# Patient Record
Sex: FEMALE | Race: BLACK OR AFRICAN AMERICAN | URBAN - METROPOLITAN AREA
[De-identification: names, ages, dates, MRNs, and addresses within clinical notes are randomized per-mention and may not be internally consistent; named-entity substitution may affect disease eponyms.]

---

## 2022-05-11 ENCOUNTER — OFFICE VISIT (OUTPATIENT)
Dept: URGENT CARE | Facility: CLINIC | Age: 62
End: 2022-05-11
Payer: COMMERCIAL

## 2022-05-11 VITALS
WEIGHT: 147 LBS | SYSTOLIC BLOOD PRESSURE: 135 MMHG | HEIGHT: 59 IN | HEART RATE: 101 BPM | TEMPERATURE: 99 F | DIASTOLIC BLOOD PRESSURE: 81 MMHG | RESPIRATION RATE: 18 BRPM | OXYGEN SATURATION: 96 % | BODY MASS INDEX: 29.64 KG/M2

## 2022-05-11 DIAGNOSIS — M62.838 MUSCLE SPASMS OF NECK: Primary | ICD-10-CM

## 2022-05-11 PROCEDURE — 3079F DIAST BP 80-89 MM HG: CPT | Mod: CPTII,S$GLB,, | Performed by: FAMILY MEDICINE

## 2022-05-11 PROCEDURE — 1160F RVW MEDS BY RX/DR IN RCRD: CPT | Mod: CPTII,S$GLB,, | Performed by: FAMILY MEDICINE

## 2022-05-11 PROCEDURE — 96372 THER/PROPH/DIAG INJ SC/IM: CPT | Mod: S$GLB,,, | Performed by: FAMILY MEDICINE

## 2022-05-11 PROCEDURE — 1160F PR REVIEW ALL MEDS BY PRESCRIBER/CLIN PHARMACIST DOCUMENTED: ICD-10-PCS | Mod: CPTII,S$GLB,, | Performed by: FAMILY MEDICINE

## 2022-05-11 PROCEDURE — 3075F SYST BP GE 130 - 139MM HG: CPT | Mod: CPTII,S$GLB,, | Performed by: FAMILY MEDICINE

## 2022-05-11 PROCEDURE — 1159F PR MEDICATION LIST DOCUMENTED IN MEDICAL RECORD: ICD-10-PCS | Mod: CPTII,S$GLB,, | Performed by: FAMILY MEDICINE

## 2022-05-11 PROCEDURE — 3075F PR MOST RECENT SYSTOLIC BLOOD PRESS GE 130-139MM HG: ICD-10-PCS | Mod: CPTII,S$GLB,, | Performed by: FAMILY MEDICINE

## 2022-05-11 PROCEDURE — 1159F MED LIST DOCD IN RCRD: CPT | Mod: CPTII,S$GLB,, | Performed by: FAMILY MEDICINE

## 2022-05-11 PROCEDURE — 96372 PR INJECTION,THERAP/PROPH/DIAG2ST, IM OR SUBCUT: ICD-10-PCS | Mod: S$GLB,,, | Performed by: FAMILY MEDICINE

## 2022-05-11 PROCEDURE — 99203 PR OFFICE/OUTPT VISIT, NEW, LEVL III, 30-44 MIN: ICD-10-PCS | Mod: 25,S$GLB,, | Performed by: FAMILY MEDICINE

## 2022-05-11 PROCEDURE — 3079F PR MOST RECENT DIASTOLIC BLOOD PRESSURE 80-89 MM HG: ICD-10-PCS | Mod: CPTII,S$GLB,, | Performed by: FAMILY MEDICINE

## 2022-05-11 PROCEDURE — 3008F PR BODY MASS INDEX (BMI) DOCUMENTED: ICD-10-PCS | Mod: CPTII,S$GLB,, | Performed by: FAMILY MEDICINE

## 2022-05-11 PROCEDURE — 3008F BODY MASS INDEX DOCD: CPT | Mod: CPTII,S$GLB,, | Performed by: FAMILY MEDICINE

## 2022-05-11 PROCEDURE — 99203 OFFICE O/P NEW LOW 30 MIN: CPT | Mod: 25,S$GLB,, | Performed by: FAMILY MEDICINE

## 2022-05-11 RX ORDER — IBUPROFEN 800 MG/1
800 TABLET ORAL 3 TIMES DAILY
Qty: 30 TABLET | Refills: 0 | Status: SHIPPED | OUTPATIENT
Start: 2022-05-11 | End: 2022-05-21

## 2022-05-11 RX ORDER — CYCLOBENZAPRINE HCL 10 MG
10 TABLET ORAL 3 TIMES DAILY PRN
Qty: 30 TABLET | Refills: 0 | Status: SHIPPED | OUTPATIENT
Start: 2022-05-11 | End: 2022-05-21

## 2022-05-11 RX ORDER — KETOROLAC TROMETHAMINE 30 MG/ML
30 INJECTION, SOLUTION INTRAMUSCULAR; INTRAVENOUS
Status: COMPLETED | OUTPATIENT
Start: 2022-05-11 | End: 2022-05-11

## 2022-05-11 RX ADMIN — KETOROLAC TROMETHAMINE 30 MG: 30 INJECTION, SOLUTION INTRAMUSCULAR; INTRAVENOUS at 12:05

## 2022-05-11 NOTE — PROGRESS NOTES
"Subjective:       Patient ID: Cris Rehman is a 62 y.o. female.    Vitals:  height is 4' 11" (1.499 m) and weight is 66.7 kg (147 lb). Her temperature is 98.6 °F (37 °C). Her blood pressure is 135/81 and her pulse is 101. Her respiration is 18 and oxygen saturation is 96%.     Chief Complaint: Neck Pain    Pt is coming in today with neck pain on her right side. Pt says it started Saturday, and does not know what she could have did for it to be in so much pain. Pt says this is the first time this has happened. Pt says the pain is worse at night. Pt took tylenl and ibuprofen but with no relief.   Provider note begins below:  Pt reports she is in town from [facility], drove here from [facility]. She started with neck pain Friday, denies injury or trauma, worse the past 2 days, she has hx of htn, and dm, compliant with meds, has pcp in [facility]. Denies similar pain in the past. She feels like it is spasms and "balling in pain" at times worse with movements.     Neck Pain   This is a new problem. The current episode started in the past 7 days. The problem occurs constantly. The problem has been gradually worsening. The pain is associated with an unknown factor. The pain is present in the right side. The quality of the pain is described as shooting (sore ). The pain is at a severity of 10/10. The pain is severe. The symptoms are aggravated by twisting. The pain is same all the time. Stiffness is present in the morning. Pertinent negatives include no fever, headaches or numbness. She has tried acetaminophen (ibuprofen, icy hot) for the symptoms. The treatment provided no relief.       Constitution: Negative for activity change, appetite change, chills, sweating, fatigue, fever, unexpected weight change and generalized weakness.   Neck: Positive for neck pain.   Musculoskeletal: Positive for pain. Negative for trauma, joint pain, joint swelling and abnormal ROM of joint.        Neck spasms   Skin: Negative for rash.   Neurological: " Negative for loss of balance, headaches, history of migraines, altered mental status, loss of consciousness, numbness and tingling.   Psychiatric/Behavioral: Negative for altered mental status.       Objective:      Physical Exam   Constitutional: She is oriented to person, place, and time. She appears well-developed.  Non-toxic appearance. She does not appear ill. No distress.   HENT:   Head: Normocephalic and atraumatic.   Ears:   Right Ear: External ear normal.   Left Ear: External ear normal.   Nose: Nose normal.   Mouth/Throat: Oropharynx is clear and moist.   Eyes: Conjunctivae, EOM and lids are normal. Pupils are equal, round, and reactive to light.   Neck: Trachea normal and phonation normal. Neck supple. No crepitus. There are no signs of injury. No torticollis present. No edema present. No erythema present. No neck rigidity present. decreased range of motion present. pain with movement present. No spinous process tenderness present. muscular tenderness present.   Musculoskeletal:      Cervical back: She exhibits spasm. She exhibits no tenderness, no bony tenderness, no swelling, no deformity and no laceration.        Back:    Neurological: She is alert and oriented to person, place, and time.   Skin: Skin is warm, dry, intact and not diaphoretic.   Psychiatric: Her speech is normal and behavior is normal. Judgment and thought content normal.   Nursing note and vitals reviewed.        Assessment:       1. Muscle spasms of neck          Plan:       Advised to continue he therapy, lidocaine patches and icy Hot, Biofreeze.  Toradol injection today, Flexeril, ibuprofen.  She will return home Sunday and will follow up with PCP next week.     The patient was advised that NSAID-type medications have two very important potential side effects: gastrointestinal irritation including hemorrhage and renal injuries. Pt was asked to take the medication with food and to stop if he/she experiences any GI upset. I asked pt  to call for vomiting, abdominal pain or black/bloody stools. The patient expresses understanding of these issues and questions were answered.  Pt educated on common NSAIDS, and instructed not to mix them, and to follow instructions on the label for the formulations available over the counter. Pt informed that common NSAIDs include: regular aspirin, Aleve, Advil, Excedrin, ibuprofen, Motrin, Goody's or, BC powders, meloxicam, naproxen, diclofenac, Celebrex. Patient was counseled that the chronic use of anti-inflammatory medication can increase the risk of GI bleed and cardiovascular events as well as cause kidney damage and increase blood pressure.    Discussed results/diagnosis/plan with patient in clinic. Strict precautions given to patient to monitor for worsening signs and symptoms. Advised to follow up with PCP or specialist.    Explained side effects of medications prescribed with patient and informed him/her to discontinue use if he/she has any side effects and to inform UC or PCP if this occurs. All questions answered. Strict ED verses clinic return precautions stressed and given in depth. Advised if symptoms worsens of fail to improve he/she should go to the Emergency Room. Discharge and follow-up instructions given verbally/printed with the patient who expressed understanding and willingness to comply with my recommendations. Patient voiced understanding and in agreement with current treatment plan. Patient exits the exam room in no acute distress. Conversant and engaged during discharge discussion, verbalized understanding.      Muscle spasms of neck  -     ketorolac injection 30 mg  -     cyclobenzaprine (FLEXERIL) 10 MG tablet; Take 1 tablet (10 mg total) by mouth 3 (three) times daily as needed for Muscle spasms.  Dispense: 30 tablet; Refill: 0  -     ibuprofen (ADVIL,MOTRIN) 800 MG tablet; Take 1 tablet (800 mg total) by mouth 3 (three) times daily. Take with food. for 10 days  Dispense: 30 tablet;  Refill: 0                 Patient Instructions   General Discharge Instructions   PLEASE READ YOUR DISCHARGE INSTRUCTIONS ENTIRELY AS IT CONTAINS IMPORTANT INFORMATION.  If you were prescribed a narcotic or controlled medication, do not drive or operate heavy equipment or machinery while taking these medications.  If you were prescribed antibiotics, please take them to completion.  You must understand that you've received an Urgent Care treatment only and that you may be released before all your medical problems are known or treated. You, the patient, will arrange for follow up care as instructed.    OVER THE COUNTER RECOMMENDATIONS/SUGGESTIONS.    Make sure to stay well hydrated.    Use Nasal Saline to mechanically move any post nasal drip from your eustachian tube or from the back of your throat.    Use warm salt water gargles to ease your throat pain. Warm salt water gargles as needed for sore throat- 1/2 tsp salt to 1 cup warm water, gargle as desired.    Use an antihistamine such as Claritin, Zyrtec or Allegra to dry you out.    Use pseudoephedrine (behind the counter) to decongest. Pseudoephedrine 30 mg up to 240 mg /day. It can raise your blood pressure and give you palpitations.    Use mucinex (guaifenesin) to break up mucous up to 2400mg/day to loosen any mucous.    The mucinex DM pill has a cough suppressant that can be sedating. It can be used at night to stop the tickle at the back of your throat.    You can use Mucinex D (it has guaifenesin and a high dose of pseudoephedrine) in the mornings to help decongest.    Use Afrin in each nare for no longer than 3 days, as it is addictive. It can also dry out your mucous membranes and cause elevated blood pressure. This is especially useful if you are flying.    Use Flonase 1-2 sprays/nostril per day. It is a local acting steroid nasal spray, if you develop a bloody nose, stop using the medication immediately.    Sometimes Nyquil at night is beneficial to help  you get some rest, however it is sedating and it does have an antihistamine, and tylenol.    Honey is a natural cough suppressant that can be used.    Tylenol up to 4,000 mg a day is safe for short periods and can be used for body aches, pain, and fever. However in high doses and prolonged use it can cause liver irritation.    Ibuprofen is a non-steroidal anti-inflammatory that can be used for body aches, pain, and fever.However it can also cause stomach irritation if over used.     Follow up with your PCP or specialty clinic as instructed in the next 2-3 days if not improved or as needed. You can call [phone] to schedule an appointment with appropriate provider.      If you condition worsens, we recommend that you receive another evaluation at the emergency room immediately or contact your primary medical clinic's after hours call service to discuss your concerns.      Please return here or go to the Emergency Department for any concerns or worsening condition.   You can also call [phone] to schedule an appointment with the appropriate provider.    Please return here or go to the Emergency Department for any concerns or worsening of condition.    Thank you for choosing [facility]!    Our goal in the Urgent Care is to always provide outstanding medical care. You may receive a survey by mail or e-mail in the next week regarding your experience today. We would greatly appreciate you completing and returning the survey. Your feedback provides us with a way to recognize our staff who provide very good care, and it helps us learn how to improve when your experience was below our aspiration of excellence.      We appreciate you trusting us with your medical care. We hope you feel better soon. We will be happy to take care of you for all of your future medical needs.    Sincerely,    RAYO Dumas-JESSICA      Back Pain Discharge Instructions    Alternate heat and ice for first 48 hours then  apply  heat. You may do gently stretching if tolerable.    Moist warm compresss to area several times daily.  May use a heating pad on LOW to provide heat over a towel which was dampended with warm water. DO NOT FALL ASLEEP WITH HEATING PAD ON.  Do not stay in one position to long.  When sleeping on your back place a pillow under knees to reduce tension on back.  If sleeping on your side, place pillow between knees to keep spine in better alinement.  Wear supportive shoes such as tennis shoes for support of the lower back.  Take any medication as directed.    If you were not prescribed an anti-inflammatory medication, and if you do not have any history of stomach/intestinal ulcers, or kidney disease, or are not taking a blood thinner such as Coumadin, Plavix, Pradaxa, Eloquis, or Xaralta for example, it is OK to take over the counter Ibuprofen or Advil or Motrin or Aleve as directed.  Do not take these medications on an empty stomach.    If you were prescribed a narcotic medication, do not drive or operate heavy equipment or machinery while taking these medications.    If you lose control of your bowel and/or bladder; lose sensation in between your legs by your genitalia and/or rectum or  lose control or sensation of any extremity, please go to the nearest Emergency Department immediately.    Flexeril Warning:   The medication you have been prescribed may cause drowsiness and impair your judgement.  Therefore, you should avoid driving, climbing, using machinery, etc., so as not to increase your risk of injury.  Do NOT drink any alcohol while on this medication(s).    Muscle Strain  Please return here or go to the Emergency Department for any concerns or worsening of condition.   If you were prescribed a narcotic medication, do not drive or operate heavy equipment or machinery while taking these medications.   Tylenol as needed for pain or discomfort  Use a pillow to brace your right side for sneezing, coughing or  laughing.  You can take the Muscle Relaxant (flexeril) on today as prescribed.   Warm compresses to the affected area.  If you lose sensation in between your legs by your genitalia and/or rectum, please go to the nearest Emergency Department immediately.   Follow up with your PCP in the next 3-5 days or sooner if no improvement.

## 2022-05-11 NOTE — PATIENT INSTRUCTIONS
General Discharge Instructions   PLEASE READ YOUR DISCHARGE INSTRUCTIONS ENTIRELY AS IT CONTAINS IMPORTANT INFORMATION.  If you were prescribed a narcotic or controlled medication, do not drive or operate heavy equipment or machinery while taking these medications.  If you were prescribed antibiotics, please take them to completion.  You must understand that you've received an Urgent Care treatment only and that you may be released before all your medical problems are known or treated. You, the patient, will arrange for follow up care as instructed.    OVER THE COUNTER RECOMMENDATIONS/SUGGESTIONS.    Make sure to stay well hydrated.    Use Nasal Saline to mechanically move any post nasal drip from your eustachian tube or from the back of your throat.    Use warm salt water gargles to ease your throat pain. Warm salt water gargles as needed for sore throat- 1/2 tsp salt to 1 cup warm water, gargle as desired.    Use an antihistamine such as Claritin, Zyrtec or Allegra to dry you out.    Use pseudoephedrine (behind the counter) to decongest. Pseudoephedrine 30 mg up to 240 mg /day. It can raise your blood pressure and give you palpitations.    Use mucinex (guaifenesin) to break up mucous up to 2400mg/day to loosen any mucous.    The mucinex DM pill has a cough suppressant that can be sedating. It can be used at night to stop the tickle at the back of your throat.    You can use Mucinex D (it has guaifenesin and a high dose of pseudoephedrine) in the mornings to help decongest.    Use Afrin in each nare for no longer than 3 days, as it is addictive. It can also dry out your mucous membranes and cause elevated blood pressure. This is especially useful if you are flying.    Use Flonase 1-2 sprays/nostril per day. It is a local acting steroid nasal spray, if you develop a bloody nose, stop using the medication immediately.    Sometimes Nyquil at night is beneficial to help you get some rest, however it is sedating and it  does have an antihistamine, and tylenol.    Honey is a natural cough suppressant that can be used.    Tylenol up to 4,000 mg a day is safe for short periods and can be used for body aches, pain, and fever. However in high doses and prolonged use it can cause liver irritation.    Ibuprofen is a non-steroidal anti-inflammatory that can be used for body aches, pain, and fever.However it can also cause stomach irritation if over used.     Follow up with your PCP or specialty clinic as instructed in the next 2-3 days if not improved or as needed. You can call [phone] to schedule an appointment with appropriate provider.      If you condition worsens, we recommend that you receive another evaluation at the emergency room immediately or contact your primary medical clinic's after hours call service to discuss your concerns.      Please return here or go to the Emergency Department for any concerns or worsening condition.   You can also call [phone] to schedule an appointment with the appropriate provider.    Please return here or go to the Emergency Department for any concerns or worsening of condition.    Thank you for choosing [facility]!    Our goal in the Urgent Care is to always provide outstanding medical care. You may receive a survey by mail or e-mail in the next week regarding your experience today. We would greatly appreciate you completing and returning the survey. Your feedback provides us with a way to recognize our staff who provide very good care, and it helps us learn how to improve when your experience was below our aspiration of excellence.      We appreciate you trusting us with your medical care. We hope you feel better soon. We will be happy to take care of you for all of your future medical needs.    Sincerely,    RAYO Dumas-JESSICA      Back Pain Discharge Instructions    Alternate heat and ice for first 48 hours then  apply heat. You may do gently stretching if  tolerable.    Moist warm compresss to area several times daily.  May use a heating pad on LOW to provide heat over a towel which was dampended with warm water. DO NOT FALL ASLEEP WITH HEATING PAD ON.  Do not stay in one position to long.  When sleeping on your back place a pillow under knees to reduce tension on back.  If sleeping on your side, place pillow between knees to keep spine in better alinement.  Wear supportive shoes such as tennis shoes for support of the lower back.  Take any medication as directed.    If you were not prescribed an anti-inflammatory medication, and if you do not have any history of stomach/intestinal ulcers, or kidney disease, or are not taking a blood thinner such as Coumadin, Plavix, Pradaxa, Eloquis, or Xaralta for example, it is OK to take over the counter Ibuprofen or Advil or Motrin or Aleve as directed.  Do not take these medications on an empty stomach.    If you were prescribed a narcotic medication, do not drive or operate heavy equipment or machinery while taking these medications.    If you lose control of your bowel and/or bladder; lose sensation in between your legs by your genitalia and/or rectum or  lose control or sensation of any extremity, please go to the nearest Emergency Department immediately.    Flexeril Warning:   The medication you have been prescribed may cause drowsiness and impair your judgement.  Therefore, you should avoid driving, climbing, using machinery, etc., so as not to increase your risk of injury.  Do NOT drink any alcohol while on this medication(s).    Muscle Strain  Please return here or go to the Emergency Department for any concerns or worsening of condition.   If you were prescribed a narcotic medication, do not drive or operate heavy equipment or machinery while taking these medications.   Tylenol as needed for pain or discomfort  Use a pillow to brace your right side for sneezing, coughing or laughing.  You can take the Muscle Relaxant  (flexeril) on today as prescribed.   Warm compresses to the affected area.  If you lose sensation in between your legs by your genitalia and/or rectum, please go to the nearest Emergency Department immediately.   Follow up with your PCP in the next 3-5 days or sooner if no improvement.

## 2024-10-13 ENCOUNTER — HOSPITAL ENCOUNTER (EMERGENCY)
Facility: HOSPITAL | Age: 64
Discharge: HOME | End: 2024-10-14
Attending: STUDENT IN AN ORGANIZED HEALTH CARE EDUCATION/TRAINING PROGRAM
Payer: MEDICARE

## 2024-10-13 VITALS
RESPIRATION RATE: 20 BRPM | SYSTOLIC BLOOD PRESSURE: 150 MMHG | WEIGHT: 147 LBS | HEIGHT: 59 IN | HEART RATE: 100 BPM | TEMPERATURE: 98 F | OXYGEN SATURATION: 98 % | BODY MASS INDEX: 29.64 KG/M2 | DIASTOLIC BLOOD PRESSURE: 76 MMHG

## 2024-10-13 DIAGNOSIS — S93.492A SPRAIN OF ANTERIOR TALOFIBULAR LIGAMENT OF LEFT ANKLE, INITIAL ENCOUNTER: Primary | ICD-10-CM

## 2024-10-13 DIAGNOSIS — S93.409A ANKLE SPRAIN: ICD-10-CM

## 2024-10-13 PROCEDURE — 99284 EMERGENCY DEPT VISIT MOD MDM: CPT | Mod: ER

## 2024-10-14 RX ORDER — IBUPROFEN 600 MG/1
600 TABLET ORAL EVERY 6 HOURS PRN
Qty: 20 TABLET | Refills: 0 | Status: SHIPPED | OUTPATIENT
Start: 2024-10-14

## 2024-10-14 RX ORDER — ACETAMINOPHEN 500 MG
500 TABLET ORAL EVERY 6 HOURS PRN
Qty: 20 TABLET | Refills: 0 | Status: SHIPPED | OUTPATIENT
Start: 2024-10-14

## 2024-10-14 RX ORDER — METHOCARBAMOL 500 MG/1
1000 TABLET, FILM COATED ORAL 3 TIMES DAILY
Qty: 30 TABLET | Refills: 0 | Status: SHIPPED | OUTPATIENT
Start: 2024-10-14 | End: 2024-10-19

## 2024-10-14 NOTE — ED PROVIDER NOTES
"Encounter Date: 10/13/2024       History     Chief Complaint   Patient presents with    Ankle Pain     Left lateral ankle pain since 1900 after falling down her porch stairs and hearing a "pop".      64 y.o. female who has no past medical history on file. presents to the emergency department due to  left ankle pain since 7:00 p.m. she reports twisting her ankle while attempting to go down steps at her porch.  Denies any falls or head injury does report hearing a pop at that time.  She reports since the injury she has been having progressively worsening pain to the lateral aspect of her left ankle and noted swelling so came to the emergency department for evaluation.  Patient took 800 mg of ibuprofen prior to ED presentation.  Denies any numbness or tingling.  Patient reports pain worsened when applying pressure and bearing weight in that extremity.    The history is provided by the patient.     Review of patient's allergies indicates:   Allergen Reactions    Pollen extracts      Past Medical History:   Diagnosis Date    Hypertension      History reviewed. No pertinent surgical history.  No family history on file.  Social History     Tobacco Use    Smoking status: Every Day     Current packs/day: 0.50     Types: Cigarettes    Smokeless tobacco: Never     Review of Systems   Constitutional:  Negative for chills and fever.   HENT:  Negative for congestion and rhinorrhea.    Eyes:  Negative for pain.   Respiratory:  Negative for cough and shortness of breath.    Cardiovascular:  Negative for chest pain and leg swelling.   Gastrointestinal:  Negative for abdominal pain, nausea and vomiting.   Genitourinary:  Negative for dysuria and hematuria.   Musculoskeletal:  Positive for arthralgias and joint swelling. Negative for neck pain.   Skin:  Negative for rash.   Neurological:  Negative for weakness and headaches.       Physical Exam     Initial Vitals [10/13/24 2345]   BP Pulse Resp Temp SpO2   (!) 150/76 100 20 98.3 °F " (36.8 °C) 98 %      MAP       --         Physical Exam    Nursing note and vitals reviewed.  Constitutional: She is not diaphoretic. No distress.   HENT:   Head: Normocephalic and atraumatic.   Eyes: Conjunctivae and EOM are normal. Pupils are equal, round, and reactive to light.   Neck:   Normal range of motion.  Pulmonary/Chest: Breath sounds normal. No respiratory distress.   Abdominal: Abdomen is soft. Bowel sounds are normal. She exhibits no distension. There is no abdominal tenderness.   Musculoskeletal:         General: Normal range of motion.      Cervical back: Normal range of motion.      Right ankle: Normal.      Left ankle: Tenderness present over the lateral malleolus.      Left Achilles Tendon: Normal. Garnett's test negative.      Left foot: Normal capillary refill. Normal pulse.     Neurological: She is alert.   Skin: Skin is warm. Capillary refill takes less than 2 seconds.   Psychiatric: Her behavior is normal.         ED Course   Procedures  Labs Reviewed - No data to display       Imaging Results              X-Ray Ankle Complete Left (Final result)  Result time 10/14/24 00:18:08      Final result by Vicenta Gale MD (10/14/24 00:18:08)                   Impression:      No acute bony abnormality detected.    Calcaneal spurring.      Electronically signed by: Vicenta Gale  Date:    10/14/2024  Time:    00:18               Narrative:    EXAMINATION:  THREE VIEWS OF THE LEFT ANKLE    CLINICAL HISTORY:  Sprain of unspecified ligament of unspecified ankle, initial encounter    TECHNIQUE:  AP, lateral and oblique views of the left ankle    COMPARISON:  None.    FINDINGS:  Lateral malleolar soft tissue swelling is present.  Three views of the left ankle demonstrate no acute fracture or dislocation.  There is moderate Achilles and plantar spurring the ankle mortise is intact.                                       Medications - No data to display  Medical Decision Making:   Initial Assessment:    64 y.o. female who has no past medical history on file. presents to the emergency department due to  left ankle pain since 7:00 p.m. After complete evaluation, including thorough history and physical exam, the patient's symptoms are most consistent with ankle sprain. Imaging with XR revealed no evidence of fracture, dislocation, or subluxation. An underlying ligamentous injury is unable to be excluded at this time, and additional imaging may be necessary in the future if the patient's symptoms do not improve. The joint is currently stable and additional imaging is not indicated at this time. The patient will be treated with supportive care. R.I.C.E. instructions were provided.    Differential Diagnosis:   Differential Diagnosis includes, but is not limited to:  Fracture, dislocation, compartment syndrome, nerve injury/palsy, vascular injury, DVT, rhabdomyolysis, hemarthrosis, septic joint, cellulitis, bursitis, muscle strain, ligament tear/sprain, laceration, foreign body, abrasion, soft tissue contusion, osteoarthritis.      Clinical Tests:   Radiological Study: Ordered and Reviewed             ED Course as of 10/14/24 0033   Mon Oct 14, 2024   0021 X-Ray Ankle Complete Left  X-ray without any acute osseous abnormalities. Calcaneal spurring noted. [AS]      ED Course User Index  [AS] Zoraida Duffy MD          Medical Decision Making         Clinical Impression:   Final diagnoses:  [S93.409A] Ankle sprain  [S93.492A] Sprain of anterior talofibular ligament of left ankle, initial encounter (Primary)          ED Disposition Condition    Discharge Stable          ED Prescriptions       Medication Sig Dispense Start Date End Date Auth. Provider    ibuprofen (ADVIL,MOTRIN) 600 MG tablet Take 1 tablet (600 mg total) by mouth every 6 (six) hours as needed for Pain. 20 tablet 10/14/2024 -- Zoraida Duffy MD    acetaminophen (TYLENOL) 500 MG tablet Take 1 tablet (500 mg total) by mouth every 6 (six) hours as needed  for Pain. 20 tablet 10/14/2024 -- Zoraida Duffy MD    methocarbamoL (ROBAXIN) 500 MG Tab Take 2 tablets (1,000 mg total) by mouth 3 (three) times daily. for 5 days 30 tablet 10/14/2024 10/19/2024 Zoraida Duffy MD          Follow-up Information       Follow up With Specialties Details Why Contact [facility] ED Emergency Medicine  If symptoms worsen [id number] [facility] [id number] [phone]    Primary care doctor  Schedule an appointment as soon as possible for a visit  for reassesment             DISCLAIMER: This note was prepared with [employer] voice recognition transcription software. Garbled syntax, mangled pronouns, and other bizarre constructions may be attributed to that software system.     Zoraida Duffy MD  10/14/24 0033

## 2024-10-14 NOTE — DISCHARGE INSTRUCTIONS
Thank you for coming to our Emergency Department today. It is important to remember that some problems are difficult to diagnose and may not be found during your first visit. Be sure to follow up with your primary care doctor and review any labs/imaging that was performed with them. If you do not have a primary care doctor, you may contact the one listed on your discharge paperwork or you may also call the [facility] Appointment Desk at [phone] to schedule an appointment with one.     All medications may potentially have side effects and it is impossible to predict which medications may give you side effects. If you feel that you are having a negative effect of any medication you should immediately stop taking them and seek medical attention.    Return to the ER with any questions/concerns, new/concerning symptoms, worsening or failure to improve. Do not drive or make any important decisions for 24 hours if you have received any pain medications, sedatives or mood altering drugs during your ER visit.

## 2024-10-14 NOTE — ED NOTES
All tests resulted. Chart up for re-evaluation with ER provider.   Pt stable. No acute distress noted.   Pt  and  updated on plan of care. Verbal understanding.   Denies any needs at present.